# Patient Record
Sex: FEMALE
[De-identification: names, ages, dates, MRNs, and addresses within clinical notes are randomized per-mention and may not be internally consistent; named-entity substitution may affect disease eponyms.]

---

## 2021-10-19 PROBLEM — Z00.00 ENCOUNTER FOR PREVENTIVE HEALTH EXAMINATION: Status: ACTIVE | Noted: 2021-10-19

## 2021-10-20 ENCOUNTER — APPOINTMENT (OUTPATIENT)
Dept: HEART AND VASCULAR | Facility: CLINIC | Age: 61
End: 2021-10-20

## 2021-10-20 ENCOUNTER — NON-APPOINTMENT (OUTPATIENT)
Age: 61
End: 2021-10-20

## 2021-10-20 ENCOUNTER — APPOINTMENT (OUTPATIENT)
Dept: HEART AND VASCULAR | Facility: CLINIC | Age: 61
End: 2021-10-20
Payer: COMMERCIAL

## 2021-10-20 VITALS
HEIGHT: 63 IN | RESPIRATION RATE: 14 BRPM | BODY MASS INDEX: 25.69 KG/M2 | OXYGEN SATURATION: 98 % | DIASTOLIC BLOOD PRESSURE: 84 MMHG | WEIGHT: 145 LBS | TEMPERATURE: 98 F | HEART RATE: 87 BPM | SYSTOLIC BLOOD PRESSURE: 128 MMHG

## 2021-10-20 DIAGNOSIS — Z78.9 OTHER SPECIFIED HEALTH STATUS: ICD-10-CM

## 2021-10-20 DIAGNOSIS — I10 ESSENTIAL (PRIMARY) HYPERTENSION: ICD-10-CM

## 2021-10-20 PROCEDURE — 99204 OFFICE O/P NEW MOD 45 MIN: CPT

## 2021-10-20 PROCEDURE — 93306 TTE W/DOPPLER COMPLETE: CPT

## 2021-10-20 PROCEDURE — 93000 ELECTROCARDIOGRAM COMPLETE: CPT

## 2021-10-20 RX ORDER — HYDROCHLOROTHIAZIDE 25 MG/1
25 TABLET ORAL
Refills: 0 | Status: ACTIVE | COMMUNITY

## 2021-10-20 RX ORDER — LIDOCAINE 5% 700 MG/1
5 PATCH TOPICAL
Refills: 0 | Status: ACTIVE | COMMUNITY

## 2021-10-20 RX ORDER — AMLODIPINE BESYLATE 5 MG/1
5 TABLET ORAL
Refills: 0 | Status: ACTIVE | COMMUNITY

## 2021-10-20 NOTE — HISTORY OF PRESENT ILLNESS
[FreeTextEntry1] : referred by Dr Morales for palpitations\par for the las 4-6 weeks\par feels like a "jumping" in the chest\par lasts seconds\par occurs at rest\par no syncope\par normal functional capacity\par walks for exercise\par min caffeine intake

## 2021-10-20 NOTE — DISCUSSION/SUMMARY
[FreeTextEntry1] : palpitations- likely APC's or PVC's\par echo OK\par ambi tele ordered to confirm\par had labs yesterday w dr burk\par return for treadmill stress test\par

## 2021-11-14 ENCOUNTER — NON-APPOINTMENT (OUTPATIENT)
Age: 61
End: 2021-11-14

## 2021-11-17 ENCOUNTER — APPOINTMENT (OUTPATIENT)
Dept: HEART AND VASCULAR | Facility: CLINIC | Age: 61
End: 2021-11-17

## 2021-11-18 ENCOUNTER — APPOINTMENT (OUTPATIENT)
Dept: OBGYN | Facility: CLINIC | Age: 61
End: 2021-11-18
Payer: COMMERCIAL

## 2021-11-18 VITALS
BODY MASS INDEX: 25.52 KG/M2 | SYSTOLIC BLOOD PRESSURE: 120 MMHG | WEIGHT: 144 LBS | HEIGHT: 63 IN | DIASTOLIC BLOOD PRESSURE: 80 MMHG

## 2021-11-18 DIAGNOSIS — N83.291 OTHER OVARIAN CYST, RIGHT SIDE: ICD-10-CM

## 2021-11-18 DIAGNOSIS — Z82.49 FAMILY HISTORY OF ISCHEMIC HEART DISEASE AND OTHER DISEASES OF THE CIRCULATORY SYSTEM: ICD-10-CM

## 2021-11-18 PROCEDURE — 99203 OFFICE O/P NEW LOW 30 MIN: CPT

## 2021-11-18 NOTE — REASON FOR VISIT
[Initial] : an initial consultation for [FreeTextEntry2] : Follow up on right ovarian cyst was seen on random sonogram

## 2021-11-18 NOTE — PLAN
[FreeTextEntry1] : The patient was seen 2 months by her regular GYN.\par On routine office sonogram he noticed a small right ovarian cyst.\par Since he retired since, he asked her to follow up with me. \par On bedside sonogram here; two 1.5*1.7 cm intramural uterine fibroids.\par Right simple ovarian cyst 1.2*1.8 cm.\par Tumor markers taken, and the patient was reassured.\par

## 2021-11-19 LAB
AFP-TM SERPL-MCNC: 6.3 NG/ML
BASOPHILS # BLD AUTO: 0.03 K/UL
BASOPHILS NFR BLD AUTO: 0.6 %
CANCER AG125 SERPL-ACNC: 31 U/ML
CEA SERPL-MCNC: 1.1 NG/ML
EOSINOPHIL # BLD AUTO: 0.15 K/UL
EOSINOPHIL NFR BLD AUTO: 3.1 %
HCT VFR BLD CALC: 42.2 %
HGB BLD-MCNC: 13.7 G/DL
IMM GRANULOCYTES NFR BLD AUTO: 0.2 %
LYMPHOCYTES # BLD AUTO: 1.56 K/UL
LYMPHOCYTES NFR BLD AUTO: 32 %
MAN DIFF?: NORMAL
MCHC RBC-ENTMCNC: 28.8 PG
MCHC RBC-ENTMCNC: 32.5 GM/DL
MCV RBC AUTO: 88.8 FL
MONOCYTES # BLD AUTO: 0.4 K/UL
MONOCYTES NFR BLD AUTO: 8.2 %
NEUTROPHILS # BLD AUTO: 2.72 K/UL
NEUTROPHILS NFR BLD AUTO: 55.9 %
PLATELET # BLD AUTO: 386 K/UL
RBC # BLD: 4.75 M/UL
RBC # FLD: 14.3 %
WBC # FLD AUTO: 4.87 K/UL

## 2021-12-14 ENCOUNTER — APPOINTMENT (OUTPATIENT)
Dept: HEART AND VASCULAR | Facility: CLINIC | Age: 61
End: 2021-12-14

## 2021-12-14 ENCOUNTER — APPOINTMENT (OUTPATIENT)
Dept: HEART AND VASCULAR | Facility: CLINIC | Age: 61
End: 2021-12-14
Payer: COMMERCIAL

## 2021-12-14 VITALS
OXYGEN SATURATION: 98 % | DIASTOLIC BLOOD PRESSURE: 70 MMHG | HEART RATE: 78 BPM | BODY MASS INDEX: 26.22 KG/M2 | TEMPERATURE: 98.6 F | HEIGHT: 63 IN | WEIGHT: 148 LBS | SYSTOLIC BLOOD PRESSURE: 118 MMHG | RESPIRATION RATE: 14 BRPM

## 2021-12-14 DIAGNOSIS — R00.2 PALPITATIONS: ICD-10-CM

## 2021-12-14 DIAGNOSIS — I35.8 OTHER NONRHEUMATIC AORTIC VALVE DISORDERS: ICD-10-CM

## 2021-12-14 PROCEDURE — 99214 OFFICE O/P EST MOD 30 MIN: CPT | Mod: 25

## 2021-12-14 PROCEDURE — 93015 CV STRESS TEST SUPVJ I&R: CPT

## 2021-12-14 NOTE — HISTORY OF PRESENT ILLNESS
[FreeTextEntry1] : here for testing after she was referred by Dr Morales for palpitations\par for the las 4-6 weeks\par feels like a "jumping" in the chest\par lasts seconds\par occurs at rest\par no syncope\par normal functional capacity\par walks for exercise\par min caffeine intake\par discussed ambi telemetry

## 2021-12-14 NOTE — DISCUSSION/SUMMARY
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

## 2022-01-11 ENCOUNTER — TRANSCRIPTION ENCOUNTER (OUTPATIENT)
Age: 62
End: 2022-01-11

## 2022-01-12 ENCOUNTER — RESULT REVIEW (OUTPATIENT)
Age: 62
End: 2022-01-12

## 2022-01-12 ENCOUNTER — OUTPATIENT (OUTPATIENT)
Dept: OUTPATIENT SERVICES | Facility: HOSPITAL | Age: 62
LOS: 1 days | Discharge: HOME | End: 2022-01-12
Payer: COMMERCIAL

## 2022-01-12 PROCEDURE — 88300 SURGICAL PATH GROSS: CPT | Mod: 26

## 2022-01-12 DEVICE — IMPLANTABLE DEVICE: Type: IMPLANTABLE DEVICE | Status: FUNCTIONAL

## 2022-01-12 DEVICE — KWIRE 1.1MMX15.2CM: Type: IMPLANTABLE DEVICE | Status: FUNCTIONAL

## 2022-01-12 DEVICE — K-WIRE ZIMMER (STYLE 1) DOUBLE TROCAR 0.9MM X 6": Type: IMPLANTABLE DEVICE | Status: FUNCTIONAL

## 2022-01-12 DEVICE — KWIRE 1.6MM X 15.2CM: Type: IMPLANTABLE DEVICE | Status: FUNCTIONAL

## 2022-01-22 LAB — SURGICAL PATHOLOGY STUDY: SIGNIFICANT CHANGE UP

## 2022-09-22 ENCOUNTER — APPOINTMENT (OUTPATIENT)
Dept: OBGYN | Facility: CLINIC | Age: 62
End: 2022-09-22

## 2022-09-22 VITALS — HEART RATE: 78 BPM | DIASTOLIC BLOOD PRESSURE: 83 MMHG | SYSTOLIC BLOOD PRESSURE: 120 MMHG | OXYGEN SATURATION: 95 %

## 2022-09-22 DIAGNOSIS — Z01.419 ENCOUNTER FOR GYNECOLOGICAL EXAMINATION (GENERAL) (ROUTINE) W/OUT ABNORMAL FINDINGS: ICD-10-CM

## 2022-09-22 PROCEDURE — 99396 PREV VISIT EST AGE 40-64: CPT

## 2022-09-22 NOTE — HISTORY OF PRESENT ILLNESS
[Currently Active] : currently active [Men] : men [No] : No [FreeTextEntry1] : Lenora Jorge presents for annual exam with no concerns. [Currently In Menopause] : currently in menopause

## 2022-09-29 LAB — CYTOLOGY CVX/VAG DOC THIN PREP: ABNORMAL

## 2022-11-11 ENCOUNTER — APPOINTMENT (OUTPATIENT)
Dept: HEART AND VASCULAR | Facility: CLINIC | Age: 62
End: 2022-11-11

## (undated) DEVICE — SLV COMPRESSION KNEE MED

## (undated) DEVICE — DRSG WEBRIL 4"

## (undated) DEVICE — POSITIONER FOAM EGG CRATE ULNAR 2PCS (PINK)

## (undated) DEVICE — DRAPE C ARM MINI PACK FOR 6800

## (undated) DEVICE — BUR BRASSELER OVAL 4 X 8MM

## (undated) DEVICE — BLADE 18.5 10.0 X 24

## (undated) DEVICE — DRSG KLING 4"

## (undated) DEVICE — TOURNIQUET CUFF 18" DUAL PORT SINGLE BLADDER W PLC  (BLACK)

## (undated) DEVICE — WARMING BLANKET UPPER ADULT

## (undated) DEVICE — GLV 7.5 PROTEXIS (WHITE)

## (undated) DEVICE — DRSG COBAN 3" LF NONSTERILE

## (undated) DEVICE — DRSG ADAPTIC 3 X 3"

## (undated) DEVICE — DRSG ACE BANDAGE 4"

## (undated) DEVICE — PACK ORTHO FOOT ANKLE

## (undated) DEVICE — DRSG STOCKINETTE UNDERCAST SYNTHETIC 4"